# Patient Record
Sex: FEMALE | URBAN - METROPOLITAN AREA
[De-identification: names, ages, dates, MRNs, and addresses within clinical notes are randomized per-mention and may not be internally consistent; named-entity substitution may affect disease eponyms.]

---

## 2023-09-22 ENCOUNTER — APPOINTMENT (RX ONLY)
Dept: URBAN - METROPOLITAN AREA CLINIC 86 | Facility: CLINIC | Age: 63
Setting detail: DERMATOLOGY
End: 2023-09-22

## 2023-09-22 VITALS — WEIGHT: 245 LBS | HEIGHT: 61 IN

## 2023-09-22 DIAGNOSIS — L71.8 OTHER ROSACEA: ICD-10-CM

## 2023-09-22 DIAGNOSIS — D49.2 NEOPLASM OF UNSPECIFIED BEHAVIOR OF BONE, SOFT TISSUE, AND SKIN: ICD-10-CM

## 2023-09-22 DIAGNOSIS — L40.0 PSORIASIS VULGARIS: ICD-10-CM

## 2023-09-22 PROCEDURE — ? COUNSELING

## 2023-09-22 PROCEDURE — ? PRESCRIPTION

## 2023-09-22 PROCEDURE — ? BIOPSY BY SHAVE METHOD

## 2023-09-22 PROCEDURE — 99204 OFFICE O/P NEW MOD 45 MIN: CPT | Mod: 25

## 2023-09-22 PROCEDURE — 11102 TANGNTL BX SKIN SINGLE LES: CPT

## 2023-09-22 RX ORDER — METRONIDAZOLE 7.5 MG/G
CREAM TOPICAL
Qty: 45 | Refills: 3 | Status: ERX | COMMUNITY
Start: 2023-09-22

## 2023-09-22 RX ORDER — FLUOCINONIDE 0.5 MG/ML
SOLUTION TOPICAL
Qty: 20 | Refills: 3 | Status: ERX | COMMUNITY
Start: 2023-09-22

## 2023-09-22 RX ADMIN — METRONIDAZOLE 1: 7.5 CREAM TOPICAL at 00:00

## 2023-09-22 RX ADMIN — FLUOCINONIDE 1: 0.5 SOLUTION TOPICAL at 00:00

## 2023-09-22 ASSESSMENT — LOCATION ZONE DERM
LOCATION ZONE: SCALP
LOCATION ZONE: TRUNK
LOCATION ZONE: EAR
LOCATION ZONE: SCALP
LOCATION ZONE: NOSE

## 2023-09-22 ASSESSMENT — PGA PSORIASIS: PGA PSORIASIS 2020: MODERATE

## 2023-09-22 ASSESSMENT — LOCATION SIMPLE DESCRIPTION DERM
LOCATION SIMPLE: NOSE
LOCATION SIMPLE: LEFT EAR
LOCATION SIMPLE: POSTERIOR SCALP
LOCATION SIMPLE: CHEST
LOCATION SIMPLE: SCALP
LOCATION SIMPLE: RIGHT EAR

## 2023-09-22 ASSESSMENT — BSA PSORIASIS: % BODY COVERED IN PSORIASIS: 1

## 2023-09-22 ASSESSMENT — LOCATION DETAILED DESCRIPTION DERM
LOCATION DETAILED: LEFT INFERIOR FRONTAL SCALP
LOCATION DETAILED: RIGHT CAVUM CONCHA
LOCATION DETAILED: RIGHT POSTAURICULAR SKIN
LOCATION DETAILED: LEFT INFERIOR POSTAURICULAR SKIN
LOCATION DETAILED: RIGHT INFERIOR FRONTAL SCALP
LOCATION DETAILED: LEFT CAVUM CONCHA
LOCATION DETAILED: RIGHT MEDIAL SUPERIOR CHEST
LOCATION DETAILED: NASAL DORSUM

## 2023-09-22 NOTE — PROCEDURE: BIOPSY BY SHAVE METHOD
Detail Level: Detailed
Depth Of Biopsy: dermis
Was A Bandage Applied: Yes
Size Of Lesion In Cm: 0.5
Biopsy Type: H and E
Biopsy Method: 15 blade
Anesthesia Type: 1% lidocaine with epinephrine
Additional Anesthesia Volume In Cc (Will Not Render If 0): 0
Hemostasis: Aluminum Chloride
Wound Care: Bacitracin
Dressing: bandage
Destruction After The Procedure: No
Type Of Destruction Used: Curettage
Curettage Text: The wound bed was treated with curettage after the biopsy was performed.
Cryotherapy Text: The wound bed was treated with cryotherapy after the biopsy was performed.
Electrodesiccation Text: The wound bed was treated with electrodesiccation after the biopsy was performed.
Electrodesiccation And Curettage Text: The wound bed was treated with electrodesiccation and curettage after the biopsy was performed.
Silver Nitrate Text: The wound bed was treated with silver nitrate after the biopsy was performed.
Lab: -[id number]
Lab Facility: [id number]
Consent: Written consent was obtained and risks were reviewed including but not limited to scarring, infection, bleeding, scabbing, incomplete removal, nerve damage and allergy to anesthesia.
Post-Care Instructions: I reviewed with the patient in detail post-care instructions. Patient is to keep the biopsy site dry overnight, and then apply bacitracin twice daily until healed. Patient may apply hydrogen peroxide soaks to remove any crusting.
Notification Instructions: Patient will be notified of biopsy results. However, patient instructed to call the office if not contacted within 2 weeks.
Billing Type: [employer]
Information: Selecting Yes will display possible errors in your note based on the variables you have selected. This validation is only offered as a suggestion for you. PLEASE NOTE THAT THE VALIDATION TEXT WILL BE REMOVED WHEN YOU FINALIZE YOUR NOTE. IF YOU WANT TO FAX A PRELIMINARY NOTE YOU WILL NEED TO TOGGLE THIS TO 'NO' IF YOU DO NOT WANT IT IN YOUR FAXED NOTE.

## 2023-10-17 ENCOUNTER — APPOINTMENT (RX ONLY)
Dept: URBAN - METROPOLITAN AREA CLINIC 86 | Facility: CLINIC | Age: 63
Setting detail: DERMATOLOGY
End: 2023-10-17

## 2023-10-17 VITALS — HEIGHT: 61 IN | WEIGHT: 250 LBS

## 2023-10-17 DIAGNOSIS — L71.8 OTHER ROSACEA: ICD-10-CM

## 2023-10-17 DIAGNOSIS — L81.4 OTHER MELANIN HYPERPIGMENTATION: ICD-10-CM

## 2023-10-17 DIAGNOSIS — L40.0 PSORIASIS VULGARIS: ICD-10-CM | Status: IMPROVED

## 2023-10-17 DIAGNOSIS — L30.8 OTHER SPECIFIED DERMATITIS: ICD-10-CM

## 2023-10-17 PROBLEM — L30.9 DERMATITIS, UNSPECIFIED: Status: ACTIVE | Noted: 2023-10-17

## 2023-10-17 PROCEDURE — 99214 OFFICE O/P EST MOD 30 MIN: CPT

## 2023-10-17 PROCEDURE — ? PRESCRIPTION MEDICATION MANAGEMENT

## 2023-10-17 PROCEDURE — ? COUNSELING

## 2023-10-17 PROCEDURE — ? TREATMENT REGIMEN

## 2023-10-17 PROCEDURE — ? ORDER TESTS

## 2023-10-17 ASSESSMENT — LOCATION SIMPLE DESCRIPTION DERM
LOCATION SIMPLE: NOSE
LOCATION SIMPLE: LEFT EAR
LOCATION SIMPLE: CHEST
LOCATION SIMPLE: RIGHT EAR

## 2023-10-17 ASSESSMENT — LOCATION DETAILED DESCRIPTION DERM
LOCATION DETAILED: LEFT POSTERIOR EAR
LOCATION DETAILED: RIGHT POSTERIOR EAR
LOCATION DETAILED: NASAL DORSUM
LOCATION DETAILED: MIDDLE STERNUM

## 2023-10-17 ASSESSMENT — LOCATION ZONE DERM
LOCATION ZONE: NOSE
LOCATION ZONE: EAR
LOCATION ZONE: TRUNK

## 2023-10-17 NOTE — PROCEDURE: PRESCRIPTION MEDICATION MANAGEMENT
Render In Strict Bullet Format?: No
Continue Regimen: metronidazole 0.75 % topical cream
Detail Level: Zone
Continue Regimen: fluocinonide 0.05 % topical solution

## 2023-10-17 NOTE — PROCEDURE: ORDER TESTS
Bill For Surgical Tray: no
Billing Type: [employer]
Expected Date Of Service: 10/17/2023
Performing Laboratory: [id number]
Lab Facility: [id number]

## 2023-10-17 NOTE — PROCEDURE: TREATMENT REGIMEN
Detail Level: Zone
Plan: We ordered a chest x ray and blood work be performed to further evaluate the diagnosis.

## 2024-04-17 ENCOUNTER — APPOINTMENT (RX ONLY)
Dept: URBAN - METROPOLITAN AREA CLINIC 86 | Facility: CLINIC | Age: 64
Setting detail: DERMATOLOGY
End: 2024-04-17

## 2024-04-17 DIAGNOSIS — L30.8 OTHER SPECIFIED DERMATITIS: ICD-10-CM

## 2024-04-17 DIAGNOSIS — L71.8 OTHER ROSACEA: ICD-10-CM | Status: IMPROVED

## 2024-04-17 PROCEDURE — ? COUNSELING

## 2024-04-17 PROCEDURE — 99213 OFFICE O/P EST LOW 20 MIN: CPT

## 2024-04-17 PROCEDURE — ? PRESCRIPTION MEDICATION MANAGEMENT

## 2024-04-17 PROCEDURE — ? TREATMENT REGIMEN

## 2024-04-17 ASSESSMENT — LOCATION DETAILED DESCRIPTION DERM
LOCATION DETAILED: NASAL DORSUM
LOCATION DETAILED: UPPER STERNUM

## 2024-04-17 ASSESSMENT — BSA RASH: BSA RASH: 0

## 2024-04-17 ASSESSMENT — LOCATION SIMPLE DESCRIPTION DERM
LOCATION SIMPLE: CHEST
LOCATION SIMPLE: NOSE

## 2024-04-17 ASSESSMENT — LOCATION ZONE DERM
LOCATION ZONE: NOSE
LOCATION ZONE: TRUNK

## 2024-04-17 NOTE — PROCEDURE: TREATMENT REGIMEN
Plan: New Chest XRays ( PA & LA ) and Lab order ( Angiotensin -1- Converting Enzyme ) pending from previous OV given to patient.
Detail Level: Zone

## 2024-04-17 NOTE — PROCEDURE: PRESCRIPTION MEDICATION MANAGEMENT
Render In Strict Bullet Format?: No
Continue Regimen: metronidazole 0.75 % topical cream
Detail Level: Zone